# Patient Record
Sex: FEMALE | Race: WHITE | URBAN - METROPOLITAN AREA
[De-identification: names, ages, dates, MRNs, and addresses within clinical notes are randomized per-mention and may not be internally consistent; named-entity substitution may affect disease eponyms.]

---

## 2023-09-20 ENCOUNTER — WEB ENCOUNTER (OUTPATIENT)
Dept: URBAN - METROPOLITAN AREA CLINIC 82 | Facility: CLINIC | Age: 53
End: 2023-09-20

## 2023-09-27 ENCOUNTER — OFFICE VISIT (OUTPATIENT)
Dept: URBAN - METROPOLITAN AREA CLINIC 115 | Facility: CLINIC | Age: 53
End: 2023-09-27
Payer: COMMERCIAL

## 2023-09-27 ENCOUNTER — LAB OUTSIDE AN ENCOUNTER (OUTPATIENT)
Dept: URBAN - METROPOLITAN AREA CLINIC 115 | Facility: CLINIC | Age: 53
End: 2023-09-27

## 2023-09-27 VITALS
HEART RATE: 76 BPM | SYSTOLIC BLOOD PRESSURE: 133 MMHG | DIASTOLIC BLOOD PRESSURE: 81 MMHG | TEMPERATURE: 97.6 F | WEIGHT: 179 LBS | HEIGHT: 65 IN | BODY MASS INDEX: 29.82 KG/M2

## 2023-09-27 DIAGNOSIS — R79.89 ELEVATED LFTS: ICD-10-CM

## 2023-09-27 PROCEDURE — 99204 OFFICE O/P NEW MOD 45 MIN: CPT | Performed by: STUDENT IN AN ORGANIZED HEALTH CARE EDUCATION/TRAINING PROGRAM

## 2023-09-27 PROCEDURE — 99244 OFF/OP CNSLTJ NEW/EST MOD 40: CPT | Performed by: STUDENT IN AN ORGANIZED HEALTH CARE EDUCATION/TRAINING PROGRAM

## 2023-09-27 RX ORDER — PANTOPRAZOLE SODIUM 40 MG/1
1 TABLET TABLET, DELAYED RELEASE ORAL ONCE A DAY
Status: ACTIVE | COMMUNITY

## 2023-09-27 RX ORDER — TRAZODONE HYDROCHLORIDE 50 MG/1
1 TABLET AT BEDTIME AS NEEDED TABLET ORAL ONCE A DAY
Status: ACTIVE | COMMUNITY

## 2023-09-27 RX ORDER — TOPIRAMATE 50 MG/1
1 TABLET TABLET, COATED ORAL ONCE A DAY
Status: ACTIVE | COMMUNITY

## 2023-09-27 NOTE — HPI-TODAY'S VISIT:
53 y.o female was referred by Dr. Gab Bella for an evaluation of elevated LFTs. A copy of this note will be sent to the referring provider.   Labs on 07/7/23: ALK PHOS 59, AST 30, ALT 45, TOTAL JIMMY 0.6. Repeat labs on 07/29/23: ALK PHOS 65, AST 26, ALT 40, TOTAL JIMMY 0.4. Hepatitis panel normal.   At this visit, patient states that this happened before in the past, mild elevation, nothing was completed. She denies any abdominal pain, N/V, abdominal distention, weight changes, yellowing of eyes/skin, fatigue, pruritus, bloody/black stool. Patient denies any herbal supplements, green tea extract, recent abx. She does like to take NSAIDs a few times a month for joint pain. Was having 1 drinks per day, recently stop drinking due to liver enzyme being high. Denies any family hx of liver disease. No recent imagings.   Last colonoscopy 2019, normal, repeat in 2029. No fhx of CRC.

## 2023-09-27 NOTE — PHYSICAL EXAM NEUROLOGIC:
gait unremarkable, speech unremarkable Consent: The patient's consent was obtained including but not limited to risks of crusting, scabbing, blistering, scarring, darker or lighter pigmentary change, recurrence, incomplete removal and infection. Render Post-Care Instructions In Note?: yes Post-Care Instructions: I reviewed with the patient in detail post-care instructions. Patient is to wear sunprotection, and avoid picking at any of the treated lesions. Pt may apply Vaseline to crusted or scabbing areas. Duration Of Freeze Thaw-Cycle (Seconds): 0 Detail Level: Simple

## 2023-09-28 ENCOUNTER — OFFICE VISIT (OUTPATIENT)
Dept: URBAN - METROPOLITAN AREA CLINIC 114 | Facility: CLINIC | Age: 53
End: 2023-09-28
Payer: COMMERCIAL

## 2023-09-28 DIAGNOSIS — R93.2 ABNORMAL ULTRASOUND OF LIVER: ICD-10-CM

## 2023-09-28 PROCEDURE — 76705 ECHO EXAM OF ABDOMEN: CPT | Performed by: INTERNAL MEDICINE

## 2023-10-02 ENCOUNTER — TELEPHONE ENCOUNTER (OUTPATIENT)
Dept: URBAN - METROPOLITAN AREA CLINIC 82 | Facility: CLINIC | Age: 53
End: 2023-10-02

## 2023-10-02 LAB
ACTIN (SMOOTH MUSCLE) ANTIBODY (IGG): <20
ALBUMIN: 4.5
ALT: 33
ANA SCREEN, IFA: POSITIVE
AST: 20
CALCULATED BMI: 29.1
CERULOPLASMIN: 25
DIABETES: NO
GLUCOSE, SERUM: 98
HEIGHT FEET: 5
INTERPRETATION: (no result)
INTERPRETATION: (no result)
MITOCHONDRIAL (M2) ANTIBODY: <=20
NAFLD FIBROSIS SCORE: -2.69
PLATELET COUNT: 263
RHEUMATOID FACTOR: <14
SJOGREN'S ANTIBODY (SS-A): (no result)
SJOGREN'S ANTIBODY (SS-B): (no result)
WEIGHT: 175

## 2023-10-13 ENCOUNTER — WEB ENCOUNTER (OUTPATIENT)
Dept: URBAN - METROPOLITAN AREA CLINIC 115 | Facility: CLINIC | Age: 53
End: 2023-10-13

## 2023-11-08 ENCOUNTER — OFFICE VISIT (OUTPATIENT)
Dept: URBAN - METROPOLITAN AREA CLINIC 115 | Facility: CLINIC | Age: 53
End: 2023-11-08

## 2024-01-03 ENCOUNTER — OFFICE VISIT (OUTPATIENT)
Dept: URBAN - METROPOLITAN AREA CLINIC 82 | Facility: CLINIC | Age: 54
End: 2024-01-03

## 2024-01-09 ENCOUNTER — OFFICE VISIT (OUTPATIENT)
Dept: URBAN - METROPOLITAN AREA CLINIC 82 | Facility: CLINIC | Age: 54
End: 2024-01-09
Payer: COMMERCIAL

## 2024-01-09 ENCOUNTER — LAB OUTSIDE AN ENCOUNTER (OUTPATIENT)
Dept: URBAN - METROPOLITAN AREA CLINIC 82 | Facility: CLINIC | Age: 54
End: 2024-01-09

## 2024-01-09 VITALS
TEMPERATURE: 97.2 F | HEIGHT: 65 IN | BODY MASS INDEX: 30.82 KG/M2 | HEART RATE: 97 BPM | SYSTOLIC BLOOD PRESSURE: 145 MMHG | DIASTOLIC BLOOD PRESSURE: 81 MMHG | WEIGHT: 185 LBS

## 2024-01-09 DIAGNOSIS — K76.0 FATTY LIVER: ICD-10-CM

## 2024-01-09 DIAGNOSIS — R79.89 ELEVATED LFTS: ICD-10-CM

## 2024-01-09 PROBLEM — 197321007: Status: ACTIVE | Noted: 2024-01-09

## 2024-01-09 PROCEDURE — 99214 OFFICE O/P EST MOD 30 MIN: CPT | Performed by: STUDENT IN AN ORGANIZED HEALTH CARE EDUCATION/TRAINING PROGRAM

## 2024-01-09 RX ORDER — VENLAFAXINE HYDROCHLORIDE 37.5 MG/1
1 TABLET WITH FOOD TABLET ORAL ONCE A DAY
Status: ACTIVE | COMMUNITY

## 2024-01-09 RX ORDER — ATOMOXETINE 40 MG/1
1 CAPSULE IN THE MORNING CAPSULE ORAL ONCE A DAY
Status: ACTIVE | COMMUNITY

## 2024-01-09 RX ORDER — PANTOPRAZOLE SODIUM 40 MG/1
1 TABLET TABLET, DELAYED RELEASE ORAL ONCE A DAY
Status: ACTIVE | COMMUNITY

## 2024-01-09 RX ORDER — TRAZODONE HYDROCHLORIDE 50 MG/1
1 TABLET AT BEDTIME AS NEEDED TABLET ORAL ONCE A DAY
Status: ACTIVE | COMMUNITY

## 2024-01-09 RX ORDER — TOPIRAMATE 50 MG/1
1 TABLET TABLET, COATED ORAL ONCE A DAY
Status: ACTIVE | COMMUNITY

## 2024-01-09 NOTE — HPI-TODAY'S VISIT:
53 y.o female patient w PMH of Binge eating disorder presents today for F/U on elevated LFTs.  She was last seen on 09.2023. At that visit, she reports labs on 07/7/23: ALK PHOS 59, AST 30, ALT 45, TOTAL JIMMY 0.6. Repeat labs on 07/29/23: ALK PHOS 65, AST 26, ALT 40, TOTAL JIMMY 0.4. Hepatitis panel normal.   Patient states that this happened before in the past, mild elevation, nothing was completed. She denies any abdominal pain, N/V, abdominal distention, weight changes, yellowing of eyes/skin, fatigue, pruritus, bloody/black stool. Patient denies any herbal supplements, green tea extract, recent abx. She does like to take NSAIDs a few times a month for joint pain. Was having 1 drinks per day, recently stop drinking due to liver enzyme being high. Denies any family hx of liver disease. No recent imagings. Last colonoscopy 2019, normal, repeat in 2029. No fhx of CRC.  Repeat labs on 09/2023: ASMA/AMA neg, DERIAN pos. Fibrosis score -2.694. AST 30, ALT 33. RUQ US 09/2023 notable for fatty liver.   Patient states that since last visit, she has been working w a psych NP and nutritionist for her binge eating disorder. Changed insurance so she needs to find another nutrionisit. Has been dealing w stress from taking care of [place] in [place], traveling back and forth, affects eating schedule. Overall patient knows that she needs to work on her wt loss for this fatty liver. Trying her best to stay on healthy diet. She denies any current GI sx at the time. Was able to cut back on ETOH as well.

## 2024-01-19 ENCOUNTER — OFFICE VISIT (OUTPATIENT)
Dept: URBAN - METROPOLITAN AREA TELEHEALTH 2 | Facility: TELEHEALTH | Age: 54
End: 2024-01-19
Payer: COMMERCIAL

## 2024-01-19 ENCOUNTER — OFFICE VISIT (OUTPATIENT)
Dept: URBAN - METROPOLITAN AREA TELEHEALTH 2 | Facility: TELEHEALTH | Age: 54
End: 2024-01-19

## 2024-01-19 DIAGNOSIS — K76.0 FATTY (CHANGE OF) LIVER: ICD-10-CM

## 2024-01-19 DIAGNOSIS — K74.69 OTHER CIRRHOSIS OF LIVER: ICD-10-CM

## 2024-01-19 PROCEDURE — 97802 MEDICAL NUTRITION INDIV IN: CPT | Performed by: DIETITIAN, REGISTERED

## 2024-01-19 RX ORDER — VENLAFAXINE HYDROCHLORIDE 37.5 MG/1
1 TABLET WITH FOOD TABLET ORAL ONCE A DAY
Status: ACTIVE | COMMUNITY

## 2024-01-19 RX ORDER — TOPIRAMATE 50 MG/1
1 TABLET TABLET, COATED ORAL ONCE A DAY
Status: ACTIVE | COMMUNITY

## 2024-01-19 RX ORDER — PANTOPRAZOLE SODIUM 40 MG/1
1 TABLET TABLET, DELAYED RELEASE ORAL ONCE A DAY
Status: ACTIVE | COMMUNITY

## 2024-01-19 RX ORDER — ATOMOXETINE 40 MG/1
1 CAPSULE IN THE MORNING CAPSULE ORAL ONCE A DAY
Status: ACTIVE | COMMUNITY

## 2024-01-19 RX ORDER — TRAZODONE HYDROCHLORIDE 50 MG/1
1 TABLET AT BEDTIME AS NEEDED TABLET ORAL ONCE A DAY
Status: ACTIVE | COMMUNITY

## 2024-01-20 LAB
A/G RATIO: 1.9
ALBUMIN: 4.7
ALKALINE PHOSPHATASE: 58
ALT (SGPT): 37
AST (SGOT): 23
BILIRUBIN, TOTAL: 0.6
BUN/CREATININE RATIO: (no result)
BUN: 16
CALCIUM: 9.7
CARBON DIOXIDE, TOTAL: 27
CHLORIDE: 101
CREATININE: 0.79
EGFR: 88
GLOBULIN, TOTAL: 2.5
GLUCOSE: 116
POTASSIUM: 4.1
PROTEIN, TOTAL: 7.2
SODIUM: 137

## 2024-01-22 ENCOUNTER — TELEPHONE ENCOUNTER (OUTPATIENT)
Dept: URBAN - METROPOLITAN AREA CLINIC 82 | Facility: CLINIC | Age: 54
End: 2024-01-22

## 2024-04-12 ENCOUNTER — OV EP (OUTPATIENT)
Dept: URBAN - METROPOLITAN AREA CLINIC 82 | Facility: CLINIC | Age: 54
End: 2024-04-12
Payer: COMMERCIAL

## 2024-04-12 VITALS
WEIGHT: 164.4 LBS | DIASTOLIC BLOOD PRESSURE: 78 MMHG | TEMPERATURE: 96.8 F | BODY MASS INDEX: 27.39 KG/M2 | SYSTOLIC BLOOD PRESSURE: 118 MMHG | HEART RATE: 102 BPM | HEIGHT: 65 IN

## 2024-04-12 DIAGNOSIS — K76.0 FATTY LIVER: ICD-10-CM

## 2024-04-12 DIAGNOSIS — R79.89 ELEVATED LFTS: ICD-10-CM

## 2024-04-12 PROCEDURE — 99213 OFFICE O/P EST LOW 20 MIN: CPT | Performed by: STUDENT IN AN ORGANIZED HEALTH CARE EDUCATION/TRAINING PROGRAM

## 2024-04-12 RX ORDER — ATOMOXETINE 40 MG/1
1 CAPSULE IN THE MORNING CAPSULE ORAL ONCE A DAY
Status: ACTIVE | COMMUNITY

## 2024-04-12 RX ORDER — TRAZODONE HYDROCHLORIDE 50 MG/1
1 TABLET AT BEDTIME AS NEEDED TABLET ORAL ONCE A DAY
Status: ACTIVE | COMMUNITY

## 2024-04-12 RX ORDER — VENLAFAXINE HYDROCHLORIDE 37.5 MG/1
1 TABLET WITH FOOD TABLET ORAL ONCE A DAY
Status: ACTIVE | COMMUNITY

## 2024-04-12 RX ORDER — PANTOPRAZOLE SODIUM 40 MG/1
1 TABLET TABLET, DELAYED RELEASE ORAL ONCE A DAY
Status: ACTIVE | COMMUNITY

## 2024-04-12 RX ORDER — TOPIRAMATE 50 MG/1
1 TABLET TABLET, COATED ORAL ONCE A DAY
Status: ACTIVE | COMMUNITY

## 2024-04-12 NOTE — HPI-TODAY'S VISIT:
01/09/2024: 53 y.o female patient w PMH of Binge eating disorder presents today for F/U on elevated LFTs. She was last seen on 09.2023. At that visit, she reports labs on 07/7/23: ALK PHOS 59, AST 30, ALT 45, TOTAL JIMMY 0.6. Repeat labs on 07/29/23: ALK PHOS 65, AST 26, ALT 40, TOTAL JIMMY 0.4. Hepatitis panel normal. Patient states that this happened before in the past, mild elevation, nothing was completed. She denies any abdominal pain, N/V, abdominal distention, weight changes, yellowing of eyes/skin, fatigue, pruritus, bloody/black stool. Patient denies any herbal supplements, green tea extract, recent abx. She does like to take NSAIDs a few times a month for joint pain. Was having 1 drinks per day, recently stop drinking due to liver enzyme being high. Denies any family hx of liver disease. No recent imagings. Last colonoscopy 2019, normal, repeat in 2029. No fhx of CRC.  Repeat labs on 09/2023: ASMA/AMA neg, DERIAN pos. Fibrosis score -2.694. AST 30, ALT 33. RUQ US 09/2023 notable for fatty liver. Patient states that since last visit, she has been working w a psych NP and nutritionist for her binge eating disorder. Changed insurance so she needs to find another nutrionisit. Has been dealing w stress from taking care of [place] in [place], traveling back and forth, affects eating schedule. Overall patient knows that she needs to work on her wt loss for this fatty liver. Trying her best to stay on healthy diet. She denies any current GI sx at the time. Was able to cut back on ETOH as well.   04/12/2024   Pt is here for 3 months F/U. Labs updated from last visit has improved. 1/20/24: ALK PHOS 58, AST 23, ALT 37, Total bi 0.6. Chiqui recently started on Tirzepatide in Feb 2024, was able to have great wt loss. She states that it helps w her binge eating d/o, able to have better control of what she is consuming. Also able to have more energy for exercise. Doing well. No abd pain NV, Bm regular. Has no other concerns.

## 2024-04-13 LAB
ALT (SGPT): 32
AST (SGOT): 20
FIB 4 INDEX: 0.69
FIB 4 INTERPRETATION: (no result)
PLATELETS: 281

## 2024-10-14 ENCOUNTER — DASHBOARD ENCOUNTERS (OUTPATIENT)
Age: 54
End: 2024-10-14

## 2024-10-14 ENCOUNTER — OFFICE VISIT (OUTPATIENT)
Dept: URBAN - METROPOLITAN AREA CLINIC 82 | Facility: CLINIC | Age: 54
End: 2024-10-14
Payer: COMMERCIAL

## 2024-10-14 VITALS
HEIGHT: 65 IN | TEMPERATURE: 97.2 F | DIASTOLIC BLOOD PRESSURE: 85 MMHG | BODY MASS INDEX: 25.52 KG/M2 | WEIGHT: 153.2 LBS | HEART RATE: 85 BPM | SYSTOLIC BLOOD PRESSURE: 123 MMHG

## 2024-10-14 DIAGNOSIS — R79.89 ELEVATED LFTS: ICD-10-CM

## 2024-10-14 DIAGNOSIS — R12 CHRONIC HEARTBURN: ICD-10-CM

## 2024-10-14 DIAGNOSIS — K76.0 FATTY LIVER: ICD-10-CM

## 2024-10-14 PROCEDURE — 99214 OFFICE O/P EST MOD 30 MIN: CPT | Performed by: STUDENT IN AN ORGANIZED HEALTH CARE EDUCATION/TRAINING PROGRAM

## 2024-10-14 RX ORDER — PANTOPRAZOLE SODIUM 20 MG/1
1 TABLET TABLET, DELAYED RELEASE ORAL ONCE A DAY
Qty: 30 | Refills: 1 | OUTPATIENT
Start: 2024-10-14

## 2024-10-14 RX ORDER — LOSARTAN POTASSIUM 25 MG/1
0.5 TABLET TABLET, FILM COATED ORAL ONCE A DAY
Status: ACTIVE | COMMUNITY

## 2024-10-14 RX ORDER — VENLAFAXINE HYDROCHLORIDE 37.5 MG/1
1 TABLET WITH FOOD TABLET ORAL ONCE A DAY
Status: ON HOLD | COMMUNITY

## 2024-10-14 RX ORDER — TRAZODONE HYDROCHLORIDE 50 MG/1
1 TABLET AT BEDTIME AS NEEDED TABLET ORAL ONCE A DAY
Status: ACTIVE | COMMUNITY

## 2024-10-14 RX ORDER — TOPIRAMATE 50 MG/1
1 TABLET TABLET, COATED ORAL ONCE A DAY
Status: ON HOLD | COMMUNITY

## 2024-10-14 RX ORDER — ATOMOXETINE 60 MG/1
1 CAPSULE IN THE MORNING CAPSULE ORAL ONCE A DAY
Status: ACTIVE | COMMUNITY

## 2024-10-14 RX ORDER — PANTOPRAZOLE SODIUM 40 MG/1
1 TABLET TABLET, DELAYED RELEASE ORAL ONCE A DAY
Status: ACTIVE | COMMUNITY

## 2024-10-14 NOTE — HPI-TODAY'S VISIT:
01/09/2024: 53 y.o female patient w PMH of Binge eating disorder presents today for F/U on elevated LFTs. She was last seen on 09.2023. At that visit, she reports labs on 07/7/23: ALK PHOS 59, AST 30, ALT 45, TOTAL JIMMY 0.6. Repeat labs on 07/29/23: ALK PHOS 65, AST 26, ALT 40, TOTAL JIMMY 0.4. Hepatitis panel normal. Patient states that this happened before in the past, mild elevation, nothing was completed. She denies any abdominal pain, N/V, abdominal distention, weight changes, yellowing of eyes/skin, fatigue, pruritus, bloody/black stool. Patient denies any herbal supplements, green tea extract, recent abx. She does like to take NSAIDs a few times a month for joint pain. Was having 1 drinks per day, recently stop drinking due to liver enzyme being high. Denies any family hx of liver disease. No recent imagings. Last colonoscopy 2019, normal, repeat in 2029. No fhx of CRC. Repeat labs on 09/2023: ASMA/AMA neg, DERIAN pos. Fibrosis score -2.694. AST 30, ALT 33. RUQ US 09/2023 notable for fatty liver. Patient states that since last visit, she has been working w a psych NP and nutritionist for her binge eating disorder. Changed insurance so she needs to find another nutrionisit. Has been dealing w stress from taking care of [place] in [place], traveling back and forth, affects eating schedule. Overall patient knows that she needs to work on her wt loss for this fatty liver. Trying her best to stay on healthy diet. She denies any current GI sx at the time. Was able to cut back on ETOH as well.   04/12/2024   Pt is here for 3 months F/U. Labs updated from last visit has improved. 1/20/24: ALK PHOS 58, AST 23, ALT 37, Total bi 0.6. Chiqui recently started on Tirzepatide in Feb 2024, was able to have great wt loss. She states that it helps w her binge eating d/o, able to have better control of what she is consuming. Also able to have more energy for exercise. Doing well. No abd pain NV, Bm regular. Has no other concerns.  10/14/2024 Pt is back for her 6 months FU on MAFLD. Last lab on 04/2024: FIB 4 index 0.69. AST 20, ALT 32, . Pt states that she has been doing well for the past 6 months. No new changes in her sx. Admits constipation from using GLP-1 but controlled w/ miralax. She admits chronic heartburns for the past 4 years. Was on omperazole, switched to pantoprazole 40mg in the recent years. Well controlled. Patient takes it daily. No previous EGD. Doesnt know what happen if she miss a dose. With the recent wt loss, unsure if her hearburns sx has lessen or not.

## 2024-10-15 ENCOUNTER — TELEPHONE ENCOUNTER (OUTPATIENT)
Dept: URBAN - METROPOLITAN AREA CLINIC 82 | Facility: CLINIC | Age: 54
End: 2024-10-15

## 2024-10-15 LAB
ALBUMIN/GLOBULIN RATIO: 1.9
ALBUMIN: 4.6
ALKALINE PHOSPHATASE: 52
ALT: 22
AST: 17
BILIRUBIN, TOTAL: 0.8
BUN/CREATININE RATIO: (no result)
CALCIUM: 9.5
CARBON DIOXIDE: 26
CHLORIDE: 105
CREATININE: 0.72
EGFR: 98
FIB 4 INDEX: 0.7
FIB 4 INTERPRETATION: (no result)
FIB 4 SUMMARY: (no result)
GLOBULIN: 2.4
GLUCOSE: 92
PLATELET COUNT: 291
POTASSIUM: 4.4
PROTEIN, TOTAL: 7
SODIUM: 140
UREA NITROGEN (BUN): 15